# Patient Record
Sex: FEMALE | Race: WHITE | Employment: FULL TIME | URBAN - NONMETROPOLITAN AREA
[De-identification: names, ages, dates, MRNs, and addresses within clinical notes are randomized per-mention and may not be internally consistent; named-entity substitution may affect disease eponyms.]

---

## 2021-11-10 ENCOUNTER — OFFICE VISIT (OUTPATIENT)
Dept: OBGYN CLINIC | Age: 51
End: 2021-11-10
Payer: COMMERCIAL

## 2021-11-10 VITALS
HEIGHT: 67 IN | DIASTOLIC BLOOD PRESSURE: 64 MMHG | SYSTOLIC BLOOD PRESSURE: 112 MMHG | WEIGHT: 291 LBS | BODY MASS INDEX: 45.67 KG/M2

## 2021-11-10 DIAGNOSIS — Z01.419 WOMEN'S ANNUAL ROUTINE GYNECOLOGICAL EXAMINATION: Primary | ICD-10-CM

## 2021-11-10 PROCEDURE — 99386 PREV VISIT NEW AGE 40-64: CPT

## 2021-11-10 PROCEDURE — G8484 FLU IMMUNIZE NO ADMIN: HCPCS

## 2021-11-10 RX ORDER — ACETAMINOPHEN 160 MG
TABLET,DISINTEGRATING ORAL
COMMUNITY

## 2021-11-10 NOTE — PROGRESS NOTES
YEARLY PHYSICAL    Date of service: 11/10/2021    Berenice Agosto  Is a 46 y.o.   female    PT's PCP is: Live Brewer MD     : 1970                                         Chaperone for Intimate Exam   Chaperone was offered as part of the rooming process. Patient declined and agrees to continue with exam without a chaperone. Subjective:       No LMP recorded.      Are your menses regular: not applicable    OB History    Para Term  AB Living   2 2 2     2   SAB IAB Ectopic Molar Multiple Live Births             2      # Outcome Date GA Lbr Dean/2nd Weight Sex Delivery Anes PTL Lv   2 Term 01    M CS-LTranv   NATALIO   1 Term 97    M CS-LTranv   NATALIO        Social History     Tobacco Use   Smoking Status Never Smoker   Smokeless Tobacco Never Used        Social History     Substance and Sexual Activity   Alcohol Use Yes    Comment: rare- beer       Family History   Problem Relation Age of Onset    Kidney Cancer Paternal Grandmother         Mets    Tremors Father     COPD Father              Cancer Mother         skin       Any family history of breast or ovarian cancer: No    Any family history of blood clots: No      Allergies: Penicillin g      Current Outpatient Medications:     Multiple Vitamin (MULTIVITAMIN ADULT PO), Take by mouth, Disp: , Rfl:     Cholecalciferol (VITAMIN D3) 50 MCG ( [place]) CAPS, Take by mouth, Disp: , Rfl:     Social History     Substance and Sexual Activity   Sexual Activity Not on file       Any bleeding or pain with intercourse: Yes, due to dryness    Last Yearly:      Last pap:     Last HPV:     Last Mammogram:     Last colorectal screen- type: colonoscopy - follows closely with provider due to history of pre-cancerous polyps     Do you do self breast exams: encouraged SBE    Past Medical History:   Diagnosis Date    Tremor     States father had hereditary tremors, hers vary with stress and tiredness       Past Surgical History:   Procedure Laterality Date    APPENDECTOMY       SECTION      DENTAL SURGERY      wisdom teeth    TONSILLECTOMY         Family History   Problem Relation Age of Onset    Kidney Cancer Paternal Grandmother         Mets    Tremors Father     COPD Father              Cancer Mother         skin       Chief Complaint   Patient presents with    Annual Exam     Annual, not seen since . Not having any issues just wanted to get reestablished. PE:  Vital Signs  Blood pressure 112/64, height 5' 7\" (1.702 m), weight 291 lb (132 kg). Estimated body mass index is 45.58 kg/m² as calculated from the following:    Height as of this encounter: 5' 7\" (1.702 m). Weight as of this encounter: 291 lb (132 kg). Labs:    No results found for this visit on 11/10/21. No data recorded    NURSE: Steven Humphries MA    HPI: Patient presents to establish care for annual visit. She has not had annual visit since . She denies breast concerns and is up to date on mammograms. She denies bowel/bladder concerns. She reports some vaginal dryness but denies other pelvic complaints. Recommended some OTC methods to try and advised patient to call clinic if she wishes to discuss other treatment options. Review of Systems   Constitutional: Negative for chills, fatigue and fever. Respiratory: Negative for shortness of breath. Cardiovascular: Negative for chest pain. Gastrointestinal: Negative for abdominal pain, constipation and diarrhea. Genitourinary: Positive for dyspareunia (due to dryness). Negative for dysuria, enuresis, frequency, menstrual problem, pelvic pain, urgency, vaginal bleeding and vaginal discharge. Neurological: Negative for dizziness, light-headedness and headaches. Physical Exam  Constitutional:       Appearance: Normal appearance.    Genitourinary:      Vulva normal. Vaginal tenderness (due to atrophic tissue) present. No vaginal discharge. Right Adnexa: not tender and not full. Left Adnexa: not tender and not full. No cervical motion tenderness or discharge. Cervical exam comments: Difficult to visualize due to body habitus. Uterus is not enlarged or tender. Pelvic exam was performed with patient in the lithotomy position. Breasts: Breasts are symmetrical.      Right: No inverted nipple, nipple discharge, skin change or tenderness. Left: No inverted nipple, nipple discharge, skin change or tenderness. HENT:      Head: Atraumatic. Mouth/Throat:      Mouth: Mucous membranes are moist.   Eyes:      Extraocular Movements: Extraocular movements intact. Cardiovascular:      Rate and Rhythm: Normal rate. Pulmonary:      Effort: Pulmonary effort is normal.   Abdominal:      General: There is no distension. Palpations: Abdomen is soft. Tenderness: There is no abdominal tenderness. Musculoskeletal:         General: Normal range of motion. Cervical back: Normal range of motion. Neurological:      General: No focal deficit present. Mental Status: She is alert and oriented to person, place, and time. Skin:     General: Skin is warm and dry. Psychiatric:         Mood and Affect: Mood normal.         Behavior: Behavior normal.         Thought Content: Thought content normal.         Judgment: Judgment normal.   Chaperone present: chaperone declined. Assessment and Plan          Diagnosis Orders   1. Women's annual routine gynecological examination  PAP SMEAR       Repeat Annual every 1 year  Cervical Cytology Evaluation begins at 24years old. If Negative Cytology, Follow-up screening per current guidelines. Mammograms every 1year. If 35 yo and last mammogram was negative. Routine healthmaintenance per patients PCP.           I am having Reva Limb maintain her Multiple Vitamin (MULTIVITAMIN ADULT PO) and Vitamin D3. Return in about 1 year (around 11/10/2022) for annual.    She was also counseled on her preventative health maintenance recommendations and follow-up. There are no Patient Instructions on file for this visit.     Vern Gastelum PA-C,11/15/2021 10:38 PM

## 2021-11-11 ENCOUNTER — HOSPITAL ENCOUNTER (OUTPATIENT)
Age: 51
Setting detail: SPECIMEN
Discharge: HOME | End: 2021-11-11
Payer: COMMERCIAL

## 2021-11-15 ASSESSMENT — ENCOUNTER SYMPTOMS
ABDOMINAL PAIN: 0
SHORTNESS OF BREATH: 0
DIARRHEA: 0
CONSTIPATION: 0

## 2021-11-16 LAB
HPV SAMPLE: NORMAL
HPV, GENOTYPE 16: NOT DETECTED
HPV, GENOTYPE 18: NOT DETECTED
HPV, HIGH RISK OTHER: NOT DETECTED
HPV, INTERPRETATION: NORMAL
SPECIMEN DESCRIPTION: NORMAL

## 2021-11-19 LAB — CYTOLOGY REPORT: NORMAL

## 2022-11-16 ENCOUNTER — OFFICE VISIT (OUTPATIENT)
Dept: OBGYN CLINIC | Age: 52
End: 2022-11-16
Payer: COMMERCIAL

## 2022-11-16 VITALS
HEIGHT: 67 IN | DIASTOLIC BLOOD PRESSURE: 88 MMHG | SYSTOLIC BLOOD PRESSURE: 128 MMHG | BODY MASS INDEX: 45.99 KG/M2 | WEIGHT: 293 LBS

## 2022-11-16 DIAGNOSIS — Z01.419 VISIT FOR GYNECOLOGIC EXAMINATION: Primary | ICD-10-CM

## 2022-11-16 PROCEDURE — G8484 FLU IMMUNIZE NO ADMIN: HCPCS

## 2022-11-16 PROCEDURE — 99396 PREV VISIT EST AGE 40-64: CPT

## 2022-11-16 NOTE — PROGRESS NOTES
YEARLY PHYSICAL    Date of service: 2022    Sonu Ricardo  Is a 45 y.o.   female    PT's PCP is: Garland Luu MD     : 1970                                         Chaperone for Intimate Exam  Chaperone was offered as part of the rooming process. Patient declined and agrees to continue with exam without a chaperone. Subjective:       No LMP recorded.  Patient is postmenopausal.     Are your menses regular: not applicable    OB History    Para Term  AB Living   2 2 2     2   SAB IAB Ectopic Molar Multiple Live Births             2      # Outcome Date GA Lbr Dean/2nd Weight Sex Delivery Anes PTL Lv   2 Term 01    M CS-LTranv   NATALIO   1 Term 97    M CS-LTranv   NATALIO        Social History     Tobacco Use   Smoking Status Never   Smokeless Tobacco Never        Social History     Substance and Sexual Activity   Alcohol Use Yes    Comment: rare- beer       Family History   Problem Relation Age of Onset    Kidney Cancer Paternal Grandmother         Mets    Tremors Father     COPD Father              Cancer Mother         skin       Any family history of breast or ovarian cancer: No    Any family history of blood clots: No      Allergies: Penicillin g      Current Outpatient Medications:     Multiple Vitamin (MULTIVITAMIN ADULT PO), Take by mouth, Disp: , Rfl:     Cholecalciferol (VITAMIN D3) 50 MCG ( [place]) CAPS, Take by mouth, Disp: , Rfl:     Social History     Substance and Sexual Activity   Sexual Activity Not Currently       Any bleeding or pain with intercourse: not sexually active    Last Yearly:  11/10/21    Last pap: 11/10/21 NL    Last HPV: 11/10/21 Neg    Last Mammogram: 11/3/22    Last Dexascan never    Last colorectal screen- type:colonoscopy*  date  unsure    Do you do self breast exams: No    Past Medical History:   Diagnosis Date    Abnormal Pap smear of cervix     Tremor States father had hereditary tremors, hers vary with stress and tiredness       Past Surgical History:   Procedure Laterality Date    APPENDECTOMY       SECTION      DENTAL SURGERY      wisdom teeth    TONSILLECTOMY         Family History   Problem Relation Age of Onset    Kidney Cancer Paternal Grandmother         Mets    Tremors Father     COPD Father              Cancer Mother         skin       Chief Complaint   Patient presents with    Annual Exam     Here for annual exam. Last annual and pap 11/10/21 NL/Neg, mammogram 11/3/22. No issues or concerns today. PE:  Vital Signs  Blood pressure 128/88, height 5' 7\" (1.702 m), weight 295 lb 6.4 oz (134 kg). Estimated body mass index is 46.27 kg/m² as calculated from the following:    Height as of this encounter: 5' 7\" (1.702 m). Weight as of this encounter: 295 lb 6.4 oz (134 kg). Labs:    No results found for this visit on 22. No data recorded    NURSE: LEA Hernandez RN    HPI: Patient presents for annual visit. Denies breast concerns. Encouraged SBE. UTD on mammgram.  Denies bowel/bladder concerns. Denies abnormal discharge. Does report pain with intercourse - discussed trial of Replens. Pap smear WNL in . Review of Systems   Constitutional:  Negative for chills, fatigue and fever. Respiratory:  Negative for shortness of breath. Cardiovascular:  Negative for chest pain. Gastrointestinal:  Negative for abdominal pain, constipation and diarrhea. Genitourinary:  Positive for dyspareunia. Negative for dysuria, frequency, menstrual problem, pelvic pain, urgency, vaginal bleeding and vaginal discharge. Neurological:  Negative for dizziness, light-headedness and headaches. Physical Exam  Constitutional:       Appearance: Normal appearance. Genitourinary:      Vulva normal.      Right Labia: No rash, tenderness or lesions. Left Labia: No tenderness, lesions or rash.      No vaginal discharge, tenderness or bleeding. Right Adnexa: not tender and not full. Left Adnexa: not tender and not full. No cervical motion tenderness or discharge. Uterus is not enlarged or tender. Pelvic exam was performed with patient in the lithotomy position. Breasts:     Breasts are symmetrical.      Right: No inverted nipple, nipple discharge, skin change or tenderness. Left: No inverted nipple, nipple discharge, skin change or tenderness. HENT:      Head: Normocephalic and atraumatic. Mouth/Throat:      Mouth: Mucous membranes are moist.   Eyes:      Extraocular Movements: Extraocular movements intact. Cardiovascular:      Rate and Rhythm: Normal rate. Pulmonary:      Effort: Pulmonary effort is normal.   Abdominal:      General: There is no distension. Palpations: Abdomen is soft. Tenderness: There is no abdominal tenderness. Musculoskeletal:         General: Normal range of motion. Cervical back: Normal range of motion. Neurological:      General: No focal deficit present. Mental Status: She is alert and oriented to person, place, and time. Skin:     General: Skin is warm and dry. Psychiatric:         Mood and Affect: Mood normal.         Behavior: Behavior normal.         Thought Content: Thought content normal.         Judgment: Judgment normal.   Chaperone present: chaperone declined. Assessment and Plan          Diagnosis Orders   1. Visit for gynecologic examination            Repeat Annual every 1 year  Cervical Cytology Evaluation begins at 24years old. If Negative Cytology, Follow-up screening per current guidelines. Mammograms every 1year. If 37 yo and last mammogram was negative. Routine healthmaintenance per patients PCP. I am having Milan Dailey maintain her Multiple Vitamin (MULTIVITAMIN ADULT PO) and Vitamin D3.     Return in about 1 year (around 11/16/2023) for annual.    She was also counseled on her preventative health maintenance recommendations and follow-up. There are no Patient Instructions on file for this visit.     Stephen Olson PA-C,11/18/2022 8:54 PM

## 2022-11-18 ASSESSMENT — ENCOUNTER SYMPTOMS
DIARRHEA: 0
ABDOMINAL PAIN: 0
SHORTNESS OF BREATH: 0
CONSTIPATION: 0

## 2023-11-28 ENCOUNTER — TELEPHONE (OUTPATIENT)
Dept: OBGYN CLINIC | Age: 53
End: 2023-11-28

## 2023-11-28 DIAGNOSIS — Z15.01 GENETIC SUSCEPTIBILITY TO MALIGNANT NEOPLASM OF BREAST: Primary | ICD-10-CM

## 2023-11-28 NOTE — TELEPHONE ENCOUNTER
Patient called back and we reviewed her results. She is interested in having a breast MRI and will check with her insurance. She has an annual appointment next month and will further discuss the additional testing at that time. Patient verbalized understanding, no further questions/concerns voiced.

## 2023-11-28 NOTE — TELEPHONE ENCOUNTER
Please call patient to review mammogram results. Let her know that mammogram was normal but did show elevated risk of breast CA (20.5%). Let her know that any results over 20% make her a candidate for screening MRI per guidelines. She can check with insurance to see if they cover it. If so, she can call for an order and plan to schedule about 6 months out from last mammogram.  Thanks!

## 2023-11-30 NOTE — TELEPHONE ENCOUNTER
Patient left a message on my voicemail that she did call her insurance and they do cover the breast MRI. If she waits until 6/2024 as planned, she will have to pay her $5,000 deductible and then 10% over the deductible if over $5,000. She has met her deductible for 2023 and would like to get this done prior to the end of the year. Ok per Vitaliy Sher to get testing done this year. Order faxed to [facility], [place], operated by [facility], they will call patient to schedule. Patient to call with any further questions/concerns.

## 2023-12-28 ENCOUNTER — OFFICE VISIT (OUTPATIENT)
Dept: OBGYN CLINIC | Age: 53
End: 2023-12-28
Payer: COMMERCIAL

## 2023-12-28 VITALS
SYSTOLIC BLOOD PRESSURE: 124 MMHG | BODY MASS INDEX: 45.99 KG/M2 | HEIGHT: 67 IN | WEIGHT: 293 LBS | DIASTOLIC BLOOD PRESSURE: 80 MMHG

## 2023-12-28 DIAGNOSIS — Z01.419 VISIT FOR GYNECOLOGIC EXAMINATION: Primary | ICD-10-CM

## 2023-12-28 DIAGNOSIS — Z91.89 INCREASED RISK OF BREAST CANCER: ICD-10-CM

## 2023-12-28 PROCEDURE — G8484 FLU IMMUNIZE NO ADMIN: HCPCS

## 2023-12-28 PROCEDURE — 99396 PREV VISIT EST AGE 40-64: CPT

## 2025-01-02 ENCOUNTER — TELEPHONE (OUTPATIENT)
Dept: OBGYN CLINIC | Age: 55
End: 2025-01-02

## 2025-01-02 NOTE — TELEPHONE ENCOUNTER
Please call patient to review mammogram results - this year's imaging normal again and lifetime risk consistent/slightly decreased from 20.5% to 20.3%.  She had issue with insurance coverage last year so not sure if she wants to proceed with trying to order MRI again.

## 2025-01-07 ENCOUNTER — OFFICE VISIT (OUTPATIENT)
Dept: OBGYN CLINIC | Age: 55
End: 2025-01-07

## 2025-01-07 VITALS — WEIGHT: 274.4 LBS | BODY MASS INDEX: 42.98 KG/M2 | DIASTOLIC BLOOD PRESSURE: 70 MMHG | SYSTOLIC BLOOD PRESSURE: 124 MMHG

## 2025-01-07 DIAGNOSIS — Z01.419 VISIT FOR GYNECOLOGIC EXAMINATION: Primary | ICD-10-CM

## 2025-01-07 NOTE — PROGRESS NOTES
YEARLY PHYSICAL    Date of service: 2025    Cassidy Chinchilla  Is a 54 y.o. female    PT's PCP is: Lynn Tran MD     : 1970                                         Chaperone for Intimate Exam  Chaperone was offered and accepted as part of the rooming process.  Chaperone: GELACIO Shukla      Subjective:       No LMP recorded. Patient is postmenopausal.     Are your menses regular: not applicable    OB History    Para Term  AB Living   2 2 2     2   SAB IAB Ectopic Molar Multiple Live Births             2      # Outcome Date GA Lbr Dean/2nd Weight Sex Type Anes PTL Lv   2 Term 01    M CS-LTranv   NATALIO   1 Term 97    M CS-LTranv   NATALIO        Social History     Tobacco Use   Smoking Status Never   Smokeless Tobacco Never        Social History     Substance and Sexual Activity   Alcohol Use Yes    Comment: rare- beer       Family History   Problem Relation Age of Onset    Kidney Cancer Paternal Grandmother         Mets    Tremors Father     COPD Father              Cancer Mother         skin       Any family history of breast or ovarian cancer: No    Any family history of blood clots: No      Allergies: Penicillin g      Current Outpatient Medications:     Multiple Vitamin (MULTIVITAMIN ADULT PO), Take by mouth, Disp: , Rfl:     Cholecalciferol (VITAMIN D3) 50 MCG ( [place]) CAPS, Take by mouth, Disp: , Rfl:     Social History     Substance and Sexual Activity   Sexual Activity Not Currently    Partners: Male       Any bleeding or pain with intercourse: n/a    Last Yearly:  2023     Last pap: 11/10/2021    Last HPV: 11/10/2021    Last Mammogram: 2024    Last Dexascan Never    Last colorectal screen- type:Colonoscopy  date  2020    Do you do self breast exams: Yes    Past Medical History:   Diagnosis Date    Abnormal Pap smear of cervix     Tremor     States father had hereditary tremors, hers

## 2025-01-08 ENCOUNTER — HOSPITAL ENCOUNTER (OUTPATIENT)
Age: 55
Setting detail: SPECIMEN
Discharge: HOME | End: 2025-01-08

## 2025-01-10 LAB
HPV I/H RISK 4 DNA CVX QL NAA+PROBE: NOT DETECTED
HPV SAMPLE: NORMAL
HPV, INTERPRETATION: NORMAL
HPV16 DNA CVX QL NAA+PROBE: NOT DETECTED
HPV18 DNA CVX QL NAA+PROBE: NOT DETECTED
SPECIMEN DESCRIPTION: NORMAL

## 2025-01-16 LAB — CYTOLOGY REPORT: NORMAL

## 2025-01-24 ASSESSMENT — ENCOUNTER SYMPTOMS
CONSTIPATION: 0
DIARRHEA: 0
SHORTNESS OF BREATH: 0
ABDOMINAL PAIN: 0